# Patient Record
Sex: MALE | Race: WHITE | Employment: OTHER | URBAN - NONMETROPOLITAN AREA
[De-identification: names, ages, dates, MRNs, and addresses within clinical notes are randomized per-mention and may not be internally consistent; named-entity substitution may affect disease eponyms.]

---

## 2024-10-15 ENCOUNTER — OFFICE VISIT (OUTPATIENT)
Dept: FAMILY MEDICINE CLINIC | Age: 64
End: 2024-10-15
Payer: COMMERCIAL

## 2024-10-15 VITALS
RESPIRATION RATE: 18 BRPM | WEIGHT: 198 LBS | HEIGHT: 72 IN | SYSTOLIC BLOOD PRESSURE: 128 MMHG | TEMPERATURE: 97.1 F | BODY MASS INDEX: 26.82 KG/M2 | HEART RATE: 74 BPM | DIASTOLIC BLOOD PRESSURE: 78 MMHG | OXYGEN SATURATION: 98 %

## 2024-10-15 DIAGNOSIS — Z12.5 SPECIAL SCREENING FOR MALIGNANT NEOPLASM OF PROSTATE: ICD-10-CM

## 2024-10-15 DIAGNOSIS — K52.9 CHRONIC DIARRHEA: Primary | ICD-10-CM

## 2024-10-15 DIAGNOSIS — Z23 NEED FOR VACCINATION: ICD-10-CM

## 2024-10-15 DIAGNOSIS — Z21 HIV INFECTION, UNSPECIFIED SYMPTOM STATUS (HCC): ICD-10-CM

## 2024-10-15 DIAGNOSIS — F32.5 MAJOR DEPRESSIVE DISORDER WITH SINGLE EPISODE, IN FULL REMISSION (HCC): ICD-10-CM

## 2024-10-15 PROCEDURE — 90715 TDAP VACCINE 7 YRS/> IM: CPT | Performed by: FAMILY MEDICINE

## 2024-10-15 PROCEDURE — 90661 CCIIV3 VAC ABX FR 0.5 ML IM: CPT | Performed by: FAMILY MEDICINE

## 2024-10-15 PROCEDURE — 90472 IMMUNIZATION ADMIN EACH ADD: CPT | Performed by: FAMILY MEDICINE

## 2024-10-15 PROCEDURE — 90471 IMMUNIZATION ADMIN: CPT | Performed by: FAMILY MEDICINE

## 2024-10-15 PROCEDURE — 99204 OFFICE O/P NEW MOD 45 MIN: CPT | Performed by: FAMILY MEDICINE

## 2024-10-15 RX ORDER — SERTRALINE HYDROCHLORIDE 100 MG/1
100 TABLET, FILM COATED ORAL 2 TIMES DAILY
Qty: 180 TABLET | Refills: 3 | Status: SHIPPED | OUTPATIENT
Start: 2024-10-15

## 2024-10-15 RX ORDER — EMTRICITABINE AND TENOFOVIR ALAFENAMIDE 200; 25 MG/1; MG/1
1 TABLET ORAL DAILY
COMMUNITY

## 2024-10-15 RX ORDER — RALTEGRAVIR 400 MG/1
400 TABLET, FILM COATED ORAL 2 TIMES DAILY
COMMUNITY

## 2024-10-15 RX ORDER — SERTRALINE HYDROCHLORIDE 100 MG/1
100 TABLET, FILM COATED ORAL 2 TIMES DAILY
COMMUNITY
End: 2024-10-15 | Stop reason: SDUPTHER

## 2024-10-15 SDOH — ECONOMIC STABILITY: FOOD INSECURITY: WITHIN THE PAST 12 MONTHS, THE FOOD YOU BOUGHT JUST DIDN'T LAST AND YOU DIDN'T HAVE MONEY TO GET MORE.: NEVER TRUE

## 2024-10-15 SDOH — ECONOMIC STABILITY: FOOD INSECURITY: WITHIN THE PAST 12 MONTHS, YOU WORRIED THAT YOUR FOOD WOULD RUN OUT BEFORE YOU GOT MONEY TO BUY MORE.: NEVER TRUE

## 2024-10-15 SDOH — ECONOMIC STABILITY: INCOME INSECURITY: HOW HARD IS IT FOR YOU TO PAY FOR THE VERY BASICS LIKE FOOD, HOUSING, MEDICAL CARE, AND HEATING?: NOT HARD AT ALL

## 2024-10-15 ASSESSMENT — PATIENT HEALTH QUESTIONNAIRE - PHQ9
SUM OF ALL RESPONSES TO PHQ QUESTIONS 1-9: 0
SUM OF ALL RESPONSES TO PHQ9 QUESTIONS 1 & 2: 0
SUM OF ALL RESPONSES TO PHQ QUESTIONS 1-9: 0
SUM OF ALL RESPONSES TO PHQ QUESTIONS 1-9: 0
1. LITTLE INTEREST OR PLEASURE IN DOING THINGS: NOT AT ALL
SUM OF ALL RESPONSES TO PHQ QUESTIONS 1-9: 0
2. FEELING DOWN, DEPRESSED OR HOPELESS: NOT AT ALL

## 2024-10-15 NOTE — PROGRESS NOTES
Vaccine Information Sheet, \"Influenza - Inactivated\"  given to Hilton Magdaleno, or parent/legal guardian of  Hilton Magdaleno and verbalized understanding.    Patient responses:    Have you ever had a reaction to a flu vaccine? No  Do you have an allergy to eggs, neomycin or polymixin?  No  Do you have an allergy to Thimerosal, contact lens solution, or Merthiolate? No  Have you ever had Guillian Upton Syndrome?  No  Do you have any current illness?  No  Do you have a temperature above 100 degrees? No  Are you pregnant? No  If pregnant, permission obtained from physician? No  Do you have an active neurological disorder? No      Flu vaccine given per order. Please see immunization tab.    Immunization(s) given during visit:    Immunizations Administered       Name Date Dose Route    Influenza, FLUCELVAX, (age 6 mo+) IM, Trivalent PF, 0.5mL 10/15/2024 0.5 mL Intramuscular    Site: Deltoid- Left    Lot: [id number]    NDC: 77658-894-98    TDaP, ADACEL (age 10y-64y), BOOSTRIX (age 10y+), IM, 0.5mL 10/15/2024 0.5 mL Intramuscular    Site: Deltoid- Right    Lot: [id number]    ND: 67628-693-31            Most recent Vaccine Information Sheet dated 8.6.2021 given to pt  
allergies  Endocrine:  Heat Intolerance, Cold Intolerance, Polydipsia, Polyphagia, Polyuria      PHYSICAL EXAM:  Vitals:    10/15/24 1249   BP: 128/78   Pulse: 74   Resp: 18   Temp: 97.1 °F (36.2 °C)   SpO2: 98%   Weight: 89.8 kg (198 lb)   Height: 1.829 m (6')     Body mass index is 26.85 kg/m².     VS Reviewed  General Appearance: A&O x 3, No acute distress,well developed and well- nourished  Head: normocephalic and atraumatic  Eyes: pupils equal, round, and reactive to light, extraocular eye movements intact, conjunctivae and eye lids without erythema  ENT: external ear and ear canal clear bilaterally, TMs intact and regular, nose without deformity, nasal mucosa and turbinates normal without polyps, oropharynx normal, dentition is normal for age  Neck: supple and non-tender without mass, no thyromegaly or thyroid nodules, no cervical lymphadenopathy  Pulmonary/Chest: clear to auscultation bilaterally- no wheezes, rales or rhonchi, normal air movement, no respiratory distress or retractions  Cardiovascular: S1 and S2 auscultated w/ RRR. No murmurs, rubs, clicks, or gallops, distal pulses intact.  Abdomen: soft, non-tender, non-distended, bowel sounds physiologic,  no rebound or guarding, no masses or hernias noted. Liver and spleen without enlargement.   Extremities: no cyanosis, clubbing or edema of the lower extremities. +2 PT/DP bilaterally.   Musculoskeletal: No joint swelling or gross deformity   Neuro:  Alert, 5/5 strength globally and symmetrically, 2+ patellar reflexes bilaterally  Psych: Affect appropriate.  Mood normal. Thought process is normal without evidence of depression or psychosis. Good insight and appropriate interaction.  Cognition and memory appear to be intact.  Skin: warm and dry, no rash or erythema  Lymph:  No cervical, auricular or supraclavicular lymph nodes palpated      ASSESSMENT & PLAN  1. Chronic diarrhea    Unclear etiology  Ddx is broad  Check labs below/stool studies  Refer to

## 2024-10-15 NOTE — PATIENT INSTRUCTIONS
LAB INSTRUCTIONS:    Please complete labs within 2 week(s).    Please fast for 8 hours prior to lab collection.    The clinic will call you within 1 week of collection. If you have not heard from us within that amount of time, please call us at [phone].

## 2024-10-18 LAB
ALBUMIN: 4.8 G/DL (ref 3.5–5.2)
ALK PHOSPHATASE: 71 U/L (ref 39–118)
ALT SERPL-CCNC: 21 U/L (ref 5–41)
ANION GAP SERPL CALCULATED.3IONS-SCNC: 18 MMOL/L (ref 7–16)
AST SERPL-CCNC: 22 U/L (ref 9–50)
BASOPHILS ABSOLUTE: 0.03 K/UL (ref 0–0.2)
BASOPHILS RELATIVE PERCENT: 0.5 % (ref 0–2)
BILIRUB SERPL-MCNC: 0.4 MG/DL
BUN BLDV-MCNC: 11 MG/DL (ref 8–23)
C-REACTIVE PROTEIN: 0.2 MG/DL
CALCIUM SERPL-MCNC: 9.5 MG/DL (ref 8.6–10.5)
CHLORIDE BLD-SCNC: 105 MMOL/L (ref 96–107)
CO2: 21 MMOL/L (ref 18–32)
CREAT SERPL-MCNC: 0.95 MG/DL (ref 0.67–1.3)
EGFR IF NONAFRICAN AMERICAN: 90 ML/MIN/1.73M2
EOSINOPHILS ABSOLUTE: 0.13 K/UL (ref 0–0.8)
EOSINOPHILS RELATIVE PERCENT: 2.2 % (ref 0–5)
GLUCOSE: 96 MG/DL (ref 70–100)
HCT VFR BLD CALC: 45.2 % (ref 39–52)
HEMOGLOBIN: 15.9 G/DL (ref 13–18)
IGA: 113 MG/DL (ref 70–400)
IMMATURE GRANS (ABS): 0.02 K/UL (ref 0–0.06)
IMMATURE GRANULOCYTES %: 0.3 % (ref 0–2)
LYMPHOCYTES ABSOLUTE: 1.31 K/UL (ref 0.9–5.2)
LYMPHOCYTES RELATIVE PERCENT: 22 % (ref 20–45)
MCH RBC QN AUTO: 32.4 PG (ref 26–32)
MCHC RBC AUTO-ENTMCNC: 35.2 G/DL (ref 32–35)
MCV RBC AUTO: 92 FL (ref 75–100)
MONOCYTES ABSOLUTE: 0.62 K/UL (ref 0.1–1)
MONOCYTES RELATIVE PERCENT: 10.4 % (ref 0–13)
NEUTROPHILS ABSOLUTE: 3.84 K/UL (ref 1.9–8)
NEUTROPHILS RELATIVE PERCENT: 64.6 % (ref 45–75)
PDW BLD-RTO: 12.4 % (ref 11.2–14.8)
PLATELET # BLD: 240 THOUS/CMM (ref 140–440)
POTASSIUM SERPL-SCNC: 4.4 MMOL/L (ref 3.5–5.4)
PSA, ULTRASENSITIVE: 3.41 NG/ML (ref 0–4)
RBC # BLD: 4.9 MILL/CMM (ref 4.4–6.1)
SED RATE, AUTOMATED: 20 MM/HR (ref 0–19)
SODIUM BLD-SCNC: 144 MMOL/L (ref 135–148)
TISSUE TRANSGLUTAMINASE IGA: <0.5 U/ML
TOTAL PROTEIN: 5.5 G/DL (ref 6–8.3)
TSH SERPL DL<=0.05 MIU/L-ACNC: 3.32 UIU/ML (ref 0.27–4.2)
WBC # BLD: 6 THDS/CMM (ref 3.6–11)

## 2024-10-21 ENCOUNTER — TELEPHONE (OUTPATIENT)
Dept: FAMILY MEDICINE CLINIC | Age: 64
End: 2024-10-21

## 2024-10-21 NOTE — TELEPHONE ENCOUNTER
----- Message from Dr. Markell Davis, DO sent at 10/20/2024 10:47 AM EDT -----  Please let pt know that blood work is WNL  Will call with stool studies once available  Let me know if questions, thanks!

## 2024-10-21 NOTE — TELEPHONE ENCOUNTER
Left message on answering machine. Requested pt to call back at [phone], at their earliest convenience.

## 2024-11-05 ENCOUNTER — TELEPHONE (OUTPATIENT)
Dept: FAMILY MEDICINE CLINIC | Age: 64
End: 2024-11-05

## 2024-11-05 DIAGNOSIS — K52.9 CHRONIC DIARRHEA: Primary | ICD-10-CM

## 2024-11-05 LAB
ADENOVIRUS F 40 41 PCR: NORMAL
ASTROVIRUS PCR: NORMAL
CAMPYLOBACTER PCR: NORMAL
CRYPTOSPORIDIUM PCR: NORMAL
CYCLOSPORA CAYETANENSIS PCR: NORMAL
E COLI 0157 PCR: NORMAL
ENTAMOEBA HISTOLYTICA PCR: NORMAL
ENTEROAGGREGATIVE E.COLI: NORMAL
ENTEROPATHOGENIC E. COLI (EPEC): NORMAL
ENTEROTOXIGENIC E. COLI (ETEC) LT/ST: NORMAL
GIARDIA LAMBLIA PCR: NORMAL
NOROVIRUS GI GII PCR: NORMAL
PLESIOMONAS SHIGELLOIDES PCR: NORMAL
ROTAVIRUS A PCR: NORMAL
SALMONELLA PCR: NORMAL
SAPOVIRUS PCR: NORMAL
SHIGA-LIKE TOXIN-PRODUCING E. COLI (STEC) STX1/STX2: NORMAL
SHIGELLA SP PCR: NORMAL
VIBRIO CHOLERAE PCR: NORMAL
VIBRIO PCR: NORMAL
YERSINIA ENTEROCOLITICA PCR: NORMAL

## 2024-11-05 NOTE — TELEPHONE ENCOUNTER
Called patient and informed of message.  Patient verbalized understanding.    Patient states is scheduled for his colonoscopy tomorrow, is wondering if he still needs to complete the Gastrointestinal Panel?    Please advise, thank you.

## 2024-11-05 NOTE — TELEPHONE ENCOUNTER
----- Message from Dr. Markell Davis, DO sent at 11/5/2024  6:34 AM EST -----  Please let pt know that stool testing is back.   Labs for pancreatic insufficiency and inflammatory bowel dz negative  Negative for C. Diff  Stool test for other infection was not run, the lab had some sort of issue with the specimen and it needs recollected. I've ordered this.

## 2025-03-22 ENCOUNTER — HOSPITAL ENCOUNTER (EMERGENCY)
Age: 65
Discharge: HOME | End: 2025-03-22

## 2025-03-22 VITALS
HEART RATE: 76 BPM | OXYGEN SATURATION: 100 % | TEMPERATURE: 97.1 F | DIASTOLIC BLOOD PRESSURE: 93 MMHG | RESPIRATION RATE: 20 BRPM | SYSTOLIC BLOOD PRESSURE: 125 MMHG

## 2025-03-22 DIAGNOSIS — J06.9 ACUTE UPPER RESPIRATORY INFECTION: Primary | ICD-10-CM

## 2025-03-22 PROCEDURE — 99203 OFFICE O/P NEW LOW 30 MIN: CPT

## 2025-03-22 PROCEDURE — 99213 OFFICE O/P EST LOW 20 MIN: CPT | Performed by: NURSE PRACTITIONER

## 2025-03-22 ASSESSMENT — ENCOUNTER SYMPTOMS
SINUS PRESSURE: 1
COUGH: 1
SHORTNESS OF BREATH: 0
NAUSEA: 0
DIARRHEA: 0
VOMITING: 0
SORE THROAT: 1
RHINORRHEA: 1
CHEST TIGHTNESS: 0

## 2025-03-22 NOTE — ED PROVIDER NOTES
Neurological:      General: No focal deficit present.      Mental Status: He is alert and oriented to person, place, and time.   Psychiatric:         Mood and Affect: Mood normal.         Behavior: Behavior normal.         DIAGNOSTIC RESULTS     Labs:No results found for this visit on 03/22/25.    IMAGING:    No orders to display         EKG: None      URGENT CARE COURSE:     Vitals:    03/22/25 1207   BP: (!) 125/93   Pulse: 76   Resp: 20   Temp: 97.1 °F (36.2 °C)   TempSrc: Temporal   SpO2: 100%       Medications - No data to display         PROCEDURES:  None    FINAL IMPRESSION      1. Acute upper respiratory infection          DISPOSITION/ PLAN     Patient seen and evaluated for the above symptoms.  Assessment reveals acute upper respiratory tract infection I did discuss with patient that the symptoms are likely viral in nature and that antibiotics would not be effective at this time.  We did discuss that the symptoms are likely benign and self-limiting.  Symptoms may be present for up to 7 to 10 days.  Patient is encouraged to use over-the-counter Zyrtec, Flonase, and Mucinex D.  I did print a prescription for Augmentin as he was persistent that this was a sinus infection.  He is instructed not to use this medication for 4 to 5 days and only if symptoms worsen or persist.  Can use over-the-counter cough suppressant.  Should have good hand hygiene and cover mouth when coughing.  Instructed use over-the-counter Tylenol and Motrin for pain or fever.  Should follow-up with PCP in 3 to 5 days and worsening symptoms.  Should present to the emergency department if symptoms worsen or other symptoms deemed emergent.  Patient is agreeable with the above plan and denies questions or concerns at this time.      PATIENT REFERRED TO:  Markell Davis DO  [id number] / [place] [id number]      DISCHARGE MEDICATIONS:  New Prescriptions    AMOXICILLIN-CLAVULANATE (AUGMENTIN) 875-125 MG PER TABLET    Take 1 tablet by mouth  2 times daily for 7 days       Discontinued Medications    No medications on file       Current Discharge Medication List          GREGORY Carter - CNP    (Please note that portions of this note were completed with a voice recognition program. Efforts were made to edit the dictations but occasionally words are mis-transcribed.)           Chris Esquivel, APRN - CNP  03/22/25 4768